# Patient Record
Sex: MALE | Race: WHITE | Employment: FULL TIME | URBAN - METROPOLITAN AREA
[De-identification: names, ages, dates, MRNs, and addresses within clinical notes are randomized per-mention and may not be internally consistent; named-entity substitution may affect disease eponyms.]

---

## 2022-07-06 ENCOUNTER — PROCEDURE VISIT (OUTPATIENT)
Dept: SURGERY | Age: 62
End: 2022-07-06
Payer: COMMERCIAL

## 2022-07-06 VITALS — HEART RATE: 59 BPM | SYSTOLIC BLOOD PRESSURE: 121 MMHG | DIASTOLIC BLOOD PRESSURE: 74 MMHG

## 2022-07-06 DIAGNOSIS — C44.311 BASAL CELL CARCINOMA OF ROOT OF NOSE: Primary | ICD-10-CM

## 2022-07-06 PROCEDURE — 14060 TIS TRNFR E/N/E/L 10 SQ CM/<: CPT | Performed by: DERMATOLOGY

## 2022-07-06 PROCEDURE — 17311 MOHS 1 STAGE H/N/HF/G: CPT | Performed by: DERMATOLOGY

## 2022-07-06 NOTE — PROGRESS NOTES
MOHS PROCEDURE NOTE    PHYSICIAN:  Wilian Westbrook. Leni Gongora MD, Who operated in two distinct and integrated capacities as the surgeon removing the tissue and as the pathologist examining the tissue. ASSISTANT: Diana David RN, Yenny Minor RN and Kuldip FLOWERS     REFERRING PROVIDER:  ENOCH Arciniega Si    PREOPERATIVE DIAGNOSIS: Nodular Basal Cell Carcinoma     SPECIFIC MOHS INDICATIONS:  location and need for tissue conservation    AUC SCORIN/9    POSTOPERATIVE DIAGNOSIS: SAME    LOCATION: Right root of nose    OPERATIVE PROCEDURE:  MOHS MICROGRAPHIC SURGERY    RECONSTRUCTION OF DEFECT: Bilateral Rotation Flap    PREOPERATIVE SIZE: 8x6 MM    DEFECT SIZE: 11x9 MM    LENGTH OF REPAIRED WOUND/SIZE OF FLAP/SIZE OF GRAFT:  3.85cm2    ANESTHESIA: 4 mL 1% lidocaine with epinephrine 1:100,000 buffered. EBL:  MINIMAL    DURATION OF PROCEDURE:  1 HOUR    POSTOPERATIVE OBSERVATION: 0.75 HOUR    SPECIMENS:  SEE MOHS MAP    COMPLICATIONS:  NONE    DESCRIPTION OF PROCEDURE:  The patient was given a mirror, as appropriate, and the biopsy site was identified, marked with a surgical marking pen, and verified by the patient. Options for treatment were discussed and the patient was informed that Mohs surgery was the selected treatment based on its lower recurrence rate, given the features listed above, as compared to other treatment modalities such as excision, radiation, or curettage, and agreed with this treatment plan. Risks and benefits including bruising, swelling, bleeding, infection, nerve injury, recurrence, and scarring were discussed with the patient prior to the procedure and a written consent detailing these and other risks was reviewed with the patient and signed. There was a time out for person and procedure verification. The surgical site was prepped with an antiseptic solution. Application of an antiseptic solution was repeated before each surgical stage.       Stage I:  The clinically-apparent tumor was carefully defined and debulked, determining the edge of the surgical excision. A thin layer of tumor-laden tissue was excised with a narrow margin of normal-appearing skin, using the technique of Mohs. A map was prepared to correspond to the area of skin from which it was excised. Hemostasis was achieved using electrosurgery. The wound was bandaged. The tissue was prepared for the cryostat and sectioned. 1 section(s) prepared. Each section was coded, cut, and stained for microscopic examination. The entire base and margins of the excised piece of tissue were examined by the surgeon. The tissue was examined to the level of subcutaneous fat. No tumor was identified at the peripheral margins of stage I of microscopically controlled surgery. DEFECT MANAGEMENT:    REPAIR DESCRIPTION:  Various closure modalities were discussed with the patient, and it was decided that a Rotation Flap would best preserve normal anatomic and functional relationships. Additional risks of flap necrosis, irregular scar line and wound dehiscence were discussed. The patient was placed on the procedure room table. The area was anesthetized with 1% lidocaine with epinephrine 1:100,000 buffered, was given a sterile prep using Betadine  and draped in the usual sterile fashion. Incisions were made in the appropriate fashion for the flap designed. The wound was extensively undermined and meticulous hemostasis were obtained using spot monopolar electrocoagulation. The flap was elevated and rotated into the primary defect. Subcutaneous dead space and dermis were closed using  5-0  Vicryl and the epidermis was approximated using 5-0 Fast absorbing gut interrupted epidermal sutures . WOUND COVERAGE:  The wound was cleaned with normal saline solution, dried off, Aquaphor ointment was applied, and the wound was covered. A dressing was applied for stabilization and light pressure.   The patient was given detailed oral and written instructions on postoperative care. There were no complications. The patient left the Unit in good medical condition. FOLLOW-UP:  As dissolving sutures were placed, the patient was asked to return if any questions or concerns arose, but otherwise will return to see general dermatology per their instructions. Return for scar check in 4 weeks.

## 2022-07-06 NOTE — PATIENT INSTRUCTIONS
[facility] Office Hours:    Monday-Thursday  7:30 AM-4:30 PM    Friday  9:00 AM-1:00 PM    POST-OPERATIVE CARE FOR DISSOLVING STICHES             Bandage change after 48 hours    During your procedure today, dissolving sutures, or stiches, were used to close the wound. You do not have to have stiches removed. They will dissolve (melt away) on their own. Please follow these instructions to help you recover from your procedure and help your wound heal.    CARING FOR YOUR SURGICAL SITE  The bandage should remain on and completley dry for 48 hours. Do NOT get the bandage wet. 1. After the first 48 hours, gently remove the remaining part of the bandage. It can be helpful to moisten the bandage edges in the shower. Steri strips may still be on the wound. It is ok, they will fall off slowly with the daily bandage changes. 2. Gently clean AROUND the wound daily with mild soap and water. Please avoid the area from getting wet. The more moisture is on the sutures the quicker they will dissolve. 3. Dry (pat) the area with a clean Q-tip or gauze. Try to clean off any debris or crust from the area. 4. Apply a layer of Vaseline/ Aquaphor (or Bacitracin if your doctor recommends) to the wound area only. 5. Cut a piece of Telfa (or any non-stick dressing) to fit just over the wound and secure it with paper tape. If the wound is small you may use a Band- Aid. Keep area covered for a total of 1 week(s). If the dressing comes off or if you have questions, or concerns about the dressing, please call the office for instructions! POST OPERATIVE INSTRUCTIONS    1. Activity: Do not lift anything heavier than a gallon of milk for 1 week. Also, avoid strenuous activity such as running, power walking or contact sports. 2. Eating and drinking: Do not drink alcohol for 48 hours after your procedure. Alcohol increases the chances of bleeding.   3. Medicines   -If you have discomfort, take Acetaminophen (Tylenol or Extra Strength Tylenol). Follow the instructions and warning on the bottle. -If your doctor has prescribed you an Aspirin daily, please keep taking it. Do not take extra Aspirin or medicines containing Aspirin (such as [place] and Excedrin) for 48 hours  after your procedure. Bleeding: If bleeding occurs, DO NOT remove the bandage. Put firm pressure on the area with gauze for 20 minutes without peeking. If the bleeding continue, apply pressure for another 20 minutes. If the bleeding does not stop after you apply pressure, call us right away. If you can not call, go to the nearest emergency room or urgent care facility. What to expect:  You may have these symptoms. They are normal and should get better with time:  1. Swelling. Swelling usually increases for the first 48 hours after your procedure and then begins to improve. Some soreness and redness around your wound. If we worked close to you eyes  (forehead, nose, temple, or upper cheeks) your eyes may become swollen and/ or black and blue. 2. Bruising, which could last 1 week or more. 3. Pink and bumpy appearance to the scar. This may happen a few weeks after your procedure. After 4 weeks, you may gently massage the area each day with facial moisturizer or petroleum jelly (Vaseline or Aquaphor). This will help to smooth the skin and improve the appearance of the scar. The color of your scar will fade over time, but may be pink for several months after the procedure. The scar may take 6 months to 1 year to reach its final color and appearance. 4. \"Spitting\" suture. Occasionally, an inside suture (stitch) does not completely dissolve. When this happens, (generally 4-8 weeks after surgery), it causes a bump or \"pimple\" to form on the scar. This is easily removed and is not at all serious. It does not mean the skin cancer has returned. Contact us if it happens, but do not be alarmed. Vitamin E oil is NOT necessary.  A good moisturizer is just as effective. Sunscreen IS necessary. Use at least and SPF 30 sunscreen daily- even in winter    Call us at [phone] right away if you have any of the following symptoms:  -Bleeding that you can not stop (see highlighted area above)   -Pain that lasts longer than 48 hours  -Your wound becomes  more painful, red or hot  -Bruising and swelling that does not begin to improve within the 48 hours or gets worse suddenly.

## 2022-07-07 ENCOUNTER — TELEPHONE (OUTPATIENT)
Dept: SURGERY | Age: 62
End: 2022-07-07

## 2022-07-07 NOTE — TELEPHONE ENCOUNTER
The patient was in the office on 7/6/2022 for Mohs located on the RT root of nose with bilateral rotation flap repair. The patient tolerated the procedure well and left the office in good condition. A post-operative telephone call was placed at 9:42a, 7/7/2022 in order to check on the patient's recovery process. The patient was not able to be reached and a phone message was left. Left message to call w/any questions/concerns.

## 2022-08-09 ENCOUNTER — OFFICE VISIT (OUTPATIENT)
Dept: SURGERY | Age: 62
End: 2022-08-09

## 2022-08-09 DIAGNOSIS — Z48.02 VISIT FOR SUTURE REMOVAL: Primary | ICD-10-CM

## 2022-08-09 PROCEDURE — 99024 POSTOP FOLLOW-UP VISIT: CPT | Performed by: DERMATOLOGY

## 2022-08-09 NOTE — PROGRESS NOTES
S: The patient is here for scar check s/p Mohs on the R Root of nose with  Bilateral rotation flap   repair, 5 weeks ago. The patient says scar is a little red and slightly bumpy but no other concerns. O: The scar is well-approximated and shows no signs of abnormal healing (expected amount of erythema, fullness and coloration), looks very good. Erythema normal amount for this phase post op and should continue to improve. A/P:scar - looking good. No issues with good eyelid position and nares position. Patient questions answered and expectations reviewed. The patient is scheduled for f/u scar check as needed and skin examination with General Dermatology per their recommendations. Electronically signed by Francisca Pantoja RN on 8/9/2022 at 2:04 PM    IRenny RN am scribing in the presence of Dr. Santizo Manifold 8/9/2022, 2:04 PM.    Eugene Colón MD,  personally performed the services described in this documentation as scribed by Inga Nieves RN  in my presence, and it is both accurate and complete.      Electronically signed by Vianney Enamorado MD on 8/9/2022 at 2:49 PM